# Patient Record
Sex: MALE | Race: WHITE | NOT HISPANIC OR LATINO | URBAN - METROPOLITAN AREA
[De-identification: names, ages, dates, MRNs, and addresses within clinical notes are randomized per-mention and may not be internally consistent; named-entity substitution may affect disease eponyms.]

---

## 2018-11-01 ENCOUNTER — OFFICE VISIT (OUTPATIENT)
Dept: URGENT CARE | Facility: PHYSICIAN GROUP | Age: 18
End: 2018-11-01
Payer: COMMERCIAL

## 2018-11-01 VITALS
HEIGHT: 72 IN | TEMPERATURE: 98.2 F | RESPIRATION RATE: 14 BRPM | OXYGEN SATURATION: 99 % | HEART RATE: 84 BPM | BODY MASS INDEX: 29.93 KG/M2 | DIASTOLIC BLOOD PRESSURE: 80 MMHG | SYSTOLIC BLOOD PRESSURE: 120 MMHG | WEIGHT: 221 LBS

## 2018-11-01 DIAGNOSIS — J01.90 ACUTE SINUSITIS, RECURRENCE NOT SPECIFIED, UNSPECIFIED LOCATION: ICD-10-CM

## 2018-11-01 PROCEDURE — 99204 OFFICE O/P NEW MOD 45 MIN: CPT | Performed by: PHYSICIAN ASSISTANT

## 2018-11-01 RX ORDER — AMOXICILLIN AND CLAVULANATE POTASSIUM 875; 125 MG/1; MG/1
1 TABLET, FILM COATED ORAL 2 TIMES DAILY
Qty: 14 TAB | Refills: 0 | Status: SHIPPED | OUTPATIENT
Start: 2018-11-01 | End: 2018-11-08

## 2018-11-01 ASSESSMENT — ENCOUNTER SYMPTOMS
HOARSE VOICE: 0
TINGLING: 0
SPUTUM PRODUCTION: 0
SHORTNESS OF BREATH: 0
NAUSEA: 0
NECK PAIN: 0
FOCAL WEAKNESS: 0
DIARRHEA: 0
FEVER: 0
SINUS PAIN: 1
SORE THROAT: 0
SWOLLEN GLANDS: 0
MYALGIAS: 0
HEADACHES: 1
SENSORY CHANGE: 0
SINUS PRESSURE: 1
VOMITING: 0
COUGH: 0
CHILLS: 0
ABDOMINAL PAIN: 0
WHEEZING: 0
PALPITATIONS: 0

## 2018-11-02 NOTE — PROGRESS NOTES
Subjective:      Yovani Eubanks is a 18 y.o. male who presents with Sinus Problem (sinus pressure [id number] )            Sinus Problem   This is a new problem. Episode onset: 2 months  The problem is unchanged. There has been no fever. The pain is moderate. Associated symptoms include congestion, headaches and sinus pressure (purulent rhinorrhea ). Pertinent negatives include no chills, coughing, ear pain, hoarse voice, neck pain, shortness of breath, sore throat or swollen glands. Treatments tried: Claritin. The treatment provided no relief.         No past medical history on file.    No past surgical history on file.    No family history on file.    No Known Allergies    Medications, Allergies, and current problem list reviewed today in Epic    Review of Systems   Constitutional: Negative for chills, fever and malaise/fatigue.   HENT: Positive for congestion, sinus pain and sinus pressure (purulent rhinorrhea ). Negative for ear discharge, ear pain, hoarse voice and sore throat.    Respiratory: Negative for cough, sputum production, shortness of breath and wheezing.    Cardiovascular: Negative for chest pain, palpitations and leg swelling.   Gastrointestinal: Negative for abdominal pain, diarrhea, nausea and vomiting.   Musculoskeletal: Negative for myalgias and neck pain.   Skin: Negative for rash.   Neurological: Positive for headaches. Negative for tingling, sensory change and focal weakness.     All other systems reviewed and are negative.        Objective:     /80   Pulse 84   Temp 36.8 °C (98.2 °F) (Temporal)   Resp 14   Ht 1.829 m (6')   Wt 100.2 kg (221 lb)   SpO2 99%   BMI 29.97 kg/m²      Physical Exam   Constitutional: He is oriented to person, place, and time. He appears well-developed and well-nourished. No distress.   HENT:   Head: Normocephalic and atraumatic.   Right Ear: Tympanic membrane, external ear and ear canal normal.   Left Ear: Tympanic membrane, external ear and ear canal normal.    Nose: Mucosal edema and rhinorrhea present. Right sinus exhibits maxillary sinus tenderness. Left sinus exhibits maxillary sinus tenderness.   Mouth/Throat: Uvula is midline, oropharynx is clear and moist and mucous membranes are normal.   Oropharynx with cobblestone appearance.    Eyes: Conjunctivae are normal.   Neck: Neck supple.   Cardiovascular: Normal rate, regular rhythm and normal heart sounds.  Exam reveals no gallop and no friction rub.    No murmur heard.  Pulmonary/Chest: Effort normal. No respiratory distress. He has no decreased breath sounds. He has no wheezes. He has no rhonchi. He has no rales.   Lymphadenopathy:     He has no cervical adenopathy.   Neurological: He is alert and oriented to person, place, and time. No cranial nerve deficit.   Skin: Skin is warm and dry. No rash noted.   Psychiatric: He has a normal mood and affect. His behavior is normal. Judgment and thought content normal.               Assessment/Plan:     1. Acute sinusitis, recurrence not specified, unspecified location    - amoxicillin-clavulanate (AUGMENTIN) 875-125 MG Tab; Take 1 Tab by mouth 2 times a day for 7 days.  Dispense: 14 Tab; Refill: 0    - encouraged Flonase and daily antihistamine.     Differential diagnoses, Supportive care, and indications for immediate follow-up discussed with patient.   Instructed to return to clinic or nearest emergency department for any change in condition, further concerns, or worsening of symptoms.    The patient demonstrated a good understanding and agreed with the treatment plan.    Catalina Valerio P.A.-C.